# Patient Record
Sex: MALE | Race: WHITE | Employment: FULL TIME | URBAN - METROPOLITAN AREA
[De-identification: names, ages, dates, MRNs, and addresses within clinical notes are randomized per-mention and may not be internally consistent; named-entity substitution may affect disease eponyms.]

---

## 2023-07-23 ENCOUNTER — HOSPITAL ENCOUNTER (EMERGENCY)
Age: 53
Discharge: HOME | End: 2023-07-23
Attending: EMERGENCY MEDICINE
Payer: COMMERCIAL

## 2023-07-23 VITALS
HEART RATE: 110 BPM | WEIGHT: 200 LBS | DIASTOLIC BLOOD PRESSURE: 86 MMHG | SYSTOLIC BLOOD PRESSURE: 160 MMHG | HEIGHT: 71 IN | BODY MASS INDEX: 28 KG/M2 | RESPIRATION RATE: 14 BRPM | TEMPERATURE: 98.4 F | OXYGEN SATURATION: 99 %

## 2023-07-23 DIAGNOSIS — Z51.89 VISIT FOR WOUND CHECK: Primary | ICD-10-CM

## 2023-07-23 PROCEDURE — 99283 EMERGENCY DEPT VISIT LOW MDM: CPT

## 2023-07-23 PROCEDURE — 6370000000 HC RX 637 (ALT 250 FOR IP): Performed by: EMERGENCY MEDICINE

## 2023-07-23 RX ORDER — DOXYCYCLINE HYCLATE 100 MG
100 TABLET ORAL ONCE
Status: COMPLETED | OUTPATIENT
Start: 2023-07-23 | End: 2023-07-23

## 2023-07-23 RX ORDER — DOXYCYCLINE HYCLATE 100 MG
100 TABLET ORAL 2 TIMES DAILY
Qty: 20 TABLET | Refills: 0 | Status: SHIPPED | OUTPATIENT
Start: 2023-07-23 | End: 2023-08-02

## 2023-07-23 RX ADMIN — DOXYCYCLINE HYCLATE 100 MG: 100 TABLET, COATED ORAL at 10:08

## 2023-07-23 ASSESSMENT — PAIN - FUNCTIONAL ASSESSMENT: PAIN_FUNCTIONAL_ASSESSMENT: NONE - DENIES PAIN
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
## 2023-07-23 NOTE — ED NOTES
Discharge instructions and prescriptions were reviewed and the patient will follow up with the PCP. The patient voiced understanding of these instructions.            Dianna David RN  07/23/23 1399

## 2023-07-23 NOTE — ED TRIAGE NOTES
Pt arrives with concerns for a wound to right lower leg from a dog bite that happened three weeks ago, noticed some redness around the site, wife states that it did drain a little a few days ago but not since, concerned because he is diabetic.

## 2024-05-23 ENCOUNTER — TELEPHONE (OUTPATIENT)
Dept: FAMILY MEDICINE CLINIC | Age: 54
End: 2024-05-23

## 2024-05-23 NOTE — TELEPHONE ENCOUNTER
----- Message from [facility] Esthela Holguin sent at 5/23/2024  2:34 PM EDT -----  Regarding: [facility] Appointment Request  [facility] Appointment Request    Patient needs appointment for [facility] Appointment Type: New to Provider.    Reason for Appointment Request: Requested Provider unavailable    Patient wants to be establish to Dr. Branden Avalos.   --------------------------------------------------------------------------------------------------------------------------    Relationship to Patient: Spouse/Partner WIFE     Call Back Information: OK to leave message on voicemail  Preferred Call Back Number: Phone  [phone]

## 2024-05-23 NOTE — TELEPHONE ENCOUNTER
Patient wife notified Dr. Avalos not taking new patients and Dr. Bush is booking out til September she stated she will keep looking